# Patient Record
Sex: MALE | Race: WHITE
[De-identification: names, ages, dates, MRNs, and addresses within clinical notes are randomized per-mention and may not be internally consistent; named-entity substitution may affect disease eponyms.]

---

## 2018-05-30 ENCOUNTER — HOSPITAL ENCOUNTER (OUTPATIENT)
Dept: HOSPITAL 74 - JASU-SURG | Age: 23
Discharge: HOME | End: 2018-05-30
Attending: ORTHOPAEDIC SURGERY
Payer: COMMERCIAL

## 2018-05-30 VITALS — BODY MASS INDEX: 21.6 KG/M2

## 2018-05-30 VITALS — DIASTOLIC BLOOD PRESSURE: 66 MMHG | HEART RATE: 92 BPM | SYSTOLIC BLOOD PRESSURE: 116 MMHG | TEMPERATURE: 97.8 F
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
## 2018-05-30 DIAGNOSIS — S83.281A: ICD-10-CM
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
## 2018-05-30 DIAGNOSIS — S83.511A: Primary | ICD-10-CM

## 2018-05-30 PROCEDURE — 0SBC4ZZ EXCISION OF RIGHT KNEE JOINT, PERCUTANEOUS ENDOSCOPIC APPROACH: ICD-10-PCS | Performed by: ORTHOPAEDIC SURGERY

## 2018-05-30 PROCEDURE — 0MRN4JZ REPLACEMENT OF RIGHT KNEE BURSA AND LIGAMENT WITH SYNTHETIC SUBSTITUTE, PERCUTANEOUS ENDOSCOPIC APPROACH: ICD-10-PCS | Performed by: ORTHOPAEDIC SURGERY

## 2018-05-30 NOTE — OP
Operative Note





- Note:


Operative Date: 05/30/18


Pre-Operative Diagnosis: R ACL TEAR


Operation: R ACL RECON AND PARTIAL LM


Post-Operative Diagnosis: Other (SAME + LATERAL MENISCUS TEAR)


Surgeon: Librado Abrams


Assistant: Eugene Cota


Estimated Blood Loss (mls): 0


Operative Report Dictated: Yes

## 2018-05-30 NOTE — OP
DATE OF OPERATION:  05/30/2018

 

PREOPERATIVE DIAGNOSIS:  Right anterior cruciate ligament tear.

 

POSTOPERATIVE DIAGNOSIS:  Right anterior cruciate ligament tear plus lateral meniscus

tear.

 

PROCEDURE:  Right anterior cruciate ligament reconstruction with bone patellar bone

autograft harvesting and partial lateral meniscectomy. 

 

SURGICAL ATTENDING:  Librado Abrams MD

 

FIRST ASSISTANT: ZAID Telles

 

ANESTHESIA:  Regional and general.  

 

CLOSURE:  Arthrex interference screw fixation for graft and 2-0 for paratenon and

subcutaneous, 3-0 Monocryl for skin with skin glue.  

 

ESTIMATED BLOOD LOSS:  Negligible.

 

COMPLICATIONS:  None.

 

CONDITION:  To recovery room in stable condition.  

 

DESCRIPTION OF OPERATIVE PROCEDURE: The patient was taken to the operating room on

May 30, 2018.  Regional and general anesthesia was administered by the

anesthesiologist.  IV Kefzol was given prophylactically prior to the case.  Patient

was placed supine on the operating table.  A well-padded pneumatic tourniquet was

placed on the proximal thigh.  The right lower extremity was prepped and draped in

the usual sterile fashion.  

 

First the graft was harvested.  The leg was exsanguinated with an Esmarch bandage and

the tourniquet was inflated to 275 mmHg.  A 6-cm longitudinal midline incision

centered over the patellar tendon was incised.  Hemostasis was achieved with Bovie

cautery.  Sharp dissection was carried down to the level of the extensor mechanism

from mid-patella to tibial tubercle.  The central 10 mm of the tendon were harvested

using a 10 mm double blade.  The 10 x 25 mm plugs were then harvested from the

patella and tibial tubercle using a micro-oscillating saw.  Drill holes were drilled

through each plug for allowing of passage of No. 2 FiberWire traction sutures.  The

rent in the patellar tendon was closed using 0 Vicryl interrupted suture.  The donor

site was filled with Milagros Putty.  The graft was contoured and fashioned to fit

snuggly through 10-mm sizers, was placed on the back table for later use.  

 

Next, the arthroscopic portion of the case was performed, where the portals were made

with a 15-blade followed by blunt trocar.  The medial and lateral infrapatellar

portals were made through the previously made incision.  Scope was placed in the

lateral infrapatellar portal and up into the suprapatellar pouch.  Pouch was

visualized to be clean.  The medial and lateral gutters were visualized to be clean. 

The undersurface of the patella and trochlea were visualized to be intact.  

 

With valgus stress on the knee, the medial compartment was entered.  The medial

meniscus was visualized, probed, and found to be intact.  The medial femoral condyle

was run and found to be intact as was the medial tibial plateau.  

 

In the figure 4 position, lateral compartment was entered.  Lateral meniscus was

found to have a flap tear.  This was debrided back to smooth, stable meniscal tissues

with meniscal biter and arthroscopic shaver.  The lateral  femoral condyle was run

and found to be intact as was the lateral tibial plateau.  

 

At 90 degrees, the ACL was found to be completely torn.  The PCL was found to be

intact.  The stump of the ACL was debrided using the shaver.  A notchplasty was then

performed to gain sufficient width and height of the notch to seat appropriate

position of the femoral tunnel and allow space for the new ACL graft.  A tibial guide

was sused to drill guidewire from the anteromedial proximal tibia up into the knee

just anterior to the PCL.  This was over-reamed with a 10-mm reamer.  All particulate

inside the debris was removed using the shaver.  

 

With the knee flexed greater than 120 degrees and using the AM portal, a Beath pin

was drilled in the posterior aspect of the notch until it exited the anterolateral

distal thigh.  This was done around the 10 o'clock position.  This was over-reamed

with a 10 mm flat reamer to the appropriate depth as determined by the length of the

graft.  All bone fragments were debrided using the shaver.  A traction suture was

placed through the  hole of the Beath pin and pulled out the anterolateral

distal thigh and down the tibial tunnel, as well.  The suture was used to shuttle the

traction sutures and the graft into the knee, then pulling the ACL graft into the

knee.  An 8 x 25 mm metallic Arthrex interference screw was placed between the

femoral bone plug and the femoral tunnel, through the AM portal with the knee flexed

greater than 120 degrees.  Excellent fixation was obtained.  

 

The knee was taken through a range of motion and found that the ACL tracked well over

the PCL and did not impinge by any part of the notch throughout the range of motion

from full extension to full flexion.  With 20 degrees of flexion and posterior drawer

being applied, a 9 x 25 mm tibial fully-threaded metallic Arthrex interference screw

was placed between the tibial bone plug and the tibial tunnel achieving excellent

fixation.  The knee was taken through a range of motion and found to go from full

extension and full flexion with negative Lachman, negative pivot, negative anterior

drawer.  Direct visualization of the graft again revealed good tension of the graft

with good crossing of the PCL and no impingement on the notch.  Traction sutures were

removed. 

 

The paratenon was closed using 2-0 Vicryl running suture, 2-0 for subcutaneous, and

3-0 Monocryl subcuticular with skin glue to the skin.  Sterile pressure dressing

followed by a knee immobilizer was applied.  Tourniquet was deflated with a total

tourniquet of time of approximately one hour.  No complications.

 

 

RAMIRO GONZALEZ/[id number]

DD: 05/30/2018 10:18

DT: 05/30/2018 12:41

Job #:  [id number]

## 2018-05-31 NOTE — PATH
Surgical Pathology Report



Patient Name:  MARIELY STANLEY

Accession #:  [id number]

[facility]. Rec. #:  [id number]                                                        

   /Age/Gender:  1995 (Age: 23) / M

Account:  [id number]                                                          

             Location: [facility] SURGICAL

Taken:  2018

Received:  2018

Reported:  2018

Physicians:  Librado Abrams M.D.

  



Specimen(s) Received

 RIGHT KNEE SHAVINGS 





Clinical History

Right knee ACL tear







Final Diagnosis

KNEE SHAVINGS, RIGHT, ACL REPAIR:

FRAGMENTS OF CARTILAGE, DENSE FIBROCONNECTIVE TISSUE, ADIPOSE TISSUE, AND

SYNOVIUM.





***Electronically Signed***

Michelle Murphy M.D.





Gross Description

Received in formalin labeled "right knee shavings," is a 4.5 x 3.5 x 0.6 cm

aggregate of tan-yellow soft tissue fragments. A representative portion is

submitted in one cassette.

/2018



saudi/2018